# Patient Record
Sex: FEMALE | Race: WHITE | NOT HISPANIC OR LATINO | Employment: FULL TIME | URBAN - METROPOLITAN AREA
[De-identification: names, ages, dates, MRNs, and addresses within clinical notes are randomized per-mention and may not be internally consistent; named-entity substitution may affect disease eponyms.]

---

## 2018-11-01 ENCOUNTER — OFFICE VISIT (OUTPATIENT)
Dept: OBGYN CLINIC | Facility: CLINIC | Age: 53
End: 2018-11-01
Payer: OTHER MISCELLANEOUS

## 2018-11-01 ENCOUNTER — APPOINTMENT (OUTPATIENT)
Dept: RADIOLOGY | Facility: CLINIC | Age: 53
End: 2018-11-01
Payer: OTHER MISCELLANEOUS

## 2018-11-01 DIAGNOSIS — M25.571 PAIN, JOINT, ANKLE AND FOOT, RIGHT: ICD-10-CM

## 2018-11-01 DIAGNOSIS — S90.01XA CONTUSION OF RIGHT ANKLE, INITIAL ENCOUNTER: Primary | ICD-10-CM

## 2018-11-01 PROCEDURE — 99203 OFFICE O/P NEW LOW 30 MIN: CPT | Performed by: PHYSICIAN ASSISTANT

## 2018-11-01 PROCEDURE — 73610 X-RAY EXAM OF ANKLE: CPT

## 2018-11-01 NOTE — LETTER
November 1, 2018     Patient: Rickie Siemens   YOB: 1965   Date of Visit: 11/1/2018       To Whom it May Concern: Rickie Siemens is under my professional care  She was seen in my office on 11/1/2018  She may return to work with limitations on 11/2/2018  Her restrictions are no restraining of children  Will re-evaluate in 2 weeks  Patient is allowed to wear boot while at work       If you have any questions or concerns, please don't hesitate to call  Sincerely,          Yousif Leigh PA-C        CC: No Recipients

## 2018-11-01 NOTE — PROGRESS NOTES
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  The arm crutches were placed up against the wall and they fell down  One of the crutches header over the medial malleolus and she had pain and some swelling over the medial malleolus  It was difficult for her to apply pressure through the foot  She was applying ice to the area with some relief  She denies any Tylenol or anti-inflammatories  She describes the pain as more of a dull achy pain  She denies any numbness or tingling  She denies any constitutional signs or symptoms  PAST MEDICAL HISTORY:  History reviewed  No pertinent past medical history  PAST SURGICAL HISTORY:  History reviewed  No pertinent surgical history  FAMILY HISTORY:  Family History   Problem Relation Age of Onset    Heart failure Mother     Diabetes Father     Stroke Father        SOCIAL HISTORY:  Social History   Substance Use Topics    Smoking status: Never Smoker    Smokeless tobacco: Never Used    Alcohol use Yes       MEDICATIONS:  No current outpatient prescriptions on file      ALLERGIES:  No Known Allergies    REVIEW OF SYSTEMS:  General: no fever, no chills  HEENT:  No loss of hearing or eyesight problems  Eyes:  No red eyes  Respiratory:  No coughing, shortness of breath or wheezing  Cardiovascular:  No chest pain, no palpitations  GI:  Abdomen soft nontender, denies nausea  Endocrine:  No muscle weakness, no frequent urination, no excessive thirst  Urinary:  No dysuria, no incontinence  Musculoskeletal: see HPI and PE  SKIN:  No skin rash, no dry skin  Neurological:  No headaches, no confusion  Psychiatric:  No suicide thoughts, no anxiety, no depression  Review of all other systems is negative    LABS:  HgA1c: No results found for: HGBA1C  BMP: No results found for: GLUCOSE, CALCIUM, NA, K, CO2, CL, BUN, CREATININE    _____________________________________________________  PHYSICAL EXAMINATION:  General: well developed and well nourished, alert, oriented times 3 and appears comfortable  Psychiatric: Normal  HEENT: Trachea Midline, No torticollis  Cardiovascular: No discernable arrhythmia  Pulmonary: No wheezing or stridor  Skin: No masses, erthema, lacerations, fluctation, ulcerations  Neurovascular:   L1-S1 motor and sensory intact, pulses were compared bilaterally and were equal, capillary refill less than 3 seconds  MUSCULOSKELETAL EXAMINATION:  Right ankle:  No erythema noted  Mild edema and ecchymosis noted over the medial malleolus  Skin is warm to touch  Tenderness to palpation over the medial malleolus and just proximal   Range of motion at the ankle is within normal limits but has pain at end range of motions  Strength is 5/5 with pain  Negative anterior drawer, negative posterior drawer, ankle is stable to valgus and varus stress  Patient is neurovascularly intact     _____________________________________________________  STUDIES REVIEWED:  I personally reviewed the x-rays from 11/01/2018  X-rays demonstrated no acute fractures  There are osteophyte formations and evidence of arthritis throughout the ankle  ASSESSMENT/PLAN:    Diagnoses and all orders for this visit:    Contusion of right ankle, initial encounter    Pain, joint, ankle and foot, right  -     XR ankle 3+ vw right; Future        Assessment:   Contusion right medial malleolus    Plan:   Patient was given a low tide boot to help with ambulation  She was instructed to take the boot off for hygiene purposes, driving, and while relaxing at night  She has to wear this while she is at school  She was instructed to use ice, anti-inflammatories and elevation to help with pain and swelling   She is allowed to weightbear as tolerated  She was given a work note with restrictions of no restraining of kids  She is allowed to wear the boot while at work  We will have her follow up in 2 weeks with Dr Octavio Mejia for further evaluation  Follow Up:   Two weeks    PROCEDURES PERFORMED:  None

## 2018-11-15 ENCOUNTER — OFFICE VISIT (OUTPATIENT)
Dept: OBGYN CLINIC | Facility: CLINIC | Age: 53
End: 2018-11-15
Payer: OTHER MISCELLANEOUS

## 2018-11-15 VITALS
HEIGHT: 69 IN | WEIGHT: 293 LBS | HEART RATE: 85 BPM | BODY MASS INDEX: 43.4 KG/M2 | SYSTOLIC BLOOD PRESSURE: 136 MMHG | DIASTOLIC BLOOD PRESSURE: 87 MMHG

## 2018-11-15 DIAGNOSIS — S90.01XD CONTUSION OF RIGHT ANKLE, SUBSEQUENT ENCOUNTER: Primary | ICD-10-CM

## 2018-11-15 PROCEDURE — 99213 OFFICE O/P EST LOW 20 MIN: CPT | Performed by: FAMILY MEDICINE

## 2018-11-15 NOTE — PATIENT INSTRUCTIONS
Ankle Exercises   AMBULATORY CARE:   What you need to know about ankle exercises: Ankle exercises help strengthen your ankle and improve its function after injury  These are beginning exercises  Ask your healthcare provider if you need to see a physical therapist for more advanced exercises  · Do these exercises 3 to 5 days a week , or as directed by your healthcare provider  Ask if you should perform the exercises on each ankle  · Do the exercises in the order that your healthcare provider recommends  This will help prevent swelling, chronic pain, and reinjury  Start with range of motion exercises  Then progress to strengthening exercises, and finally to balancing exercises  · Warm up before you do ankle exercises  Walk or ride a stationary bike for 5 to 10 minutes to prepare your ankle for movement  · Stop if you feel pain  It is normal to feel some discomfort at first  Regular exercise will help decrease your discomfort over time  How to perform range of motion exercises safely:  Begin with range of motion exercises to improve flexibility  Ask your healthcare provider when you can progress to strengthening exercises  · Ankle alphabet:  Sit on a chair so that your feet do not touch the floor  Use your big toe to write each letter of the alphabet  Use only your foot and ankle, and keep your movements small  Do 2 sets  · Calf stretches:      ¨ Sitting calf stretches with a towel:  Sit on the floor with both legs out straight in front of you  Loop a towel around the ball of your injured foot  Grasp the ends of the towel and pull it toward you  Keep your leg and back straight  Do not lean forward as you pull the towel  Hold for 30 seconds  Then relax for 30 seconds  Do 2 sets of 10  ¨ Standing calf stretches:  Stand facing a wall with the foot that is not injured forward and your knee slightly bent   Keep the leg with the injured foot straight and behind you with your toes pointed in slightly  With both heels flat on the floor, press your hips forward  Do not arch your back  Hold for 30 seconds, and then relax for 30 seconds  Do 2 sets of 10  Repeat with your leg bent  Do 2 sets of 10  How to perform strengthening exercises safely:  After you can perform range of motion exercises without pain, you may begin strengthening exercises  Ask your healthcare provider when you can progress to balancing exercises  · Ankle movement in 4 directions:  Sit on the floor with your legs straight in front of you  Keep your heels on the floor for support  ¨ Dorsiflexion:  Begin with your toes pointing straight up  Pull your toes toward your body  Slowly return to the starting position  Do 3 sets of 5      ¨ Plantar flexion:  Begin with your toes pointing straight up  Push your toes away from your body  Slowly return to the starting position  Do 3 sets of 5            ¨ Inversion:  Begin with your toes pointing straight up  Push your toes inward, toward each other  Slowly return to the starting position  Do 3 sets of 5      ¨ Eversion:  Begin with your toes pointing straight up  Push your toes outward, away from each other  Slowly return to the starting position  Do 3 sets of 5          · Toe curls with a towel:  Sit on a chair so that both of your feet are flat on the floor  Place a small towel on the floor in front of your injured foot  Grab the center of the towel with your toes and curl the towel toward you  Relax and repeat  Do 1 set of 5            · [place] pick-ups:  Sit on a chair so that both of your feet are flat on the floor  Place 20 marbles on the floor in front of your injured foot  Use your toes to  one marble at a time and place it into a bowl  Repeat until you have picked up all the marbles  Do 1 set  · Heel raises:      ¨ Single leg heel raises:  Stand with your weight evenly on both feet  Hold on to a chair or a wall for balance   Lift the foot that is not injured off the floor so all your weight is placed on your injured foot  Raise the heel of your injured foot as high as you can  Slowly lower your heel to the floor  Do 1 set of 10  ¨ Double leg heel raises:  Stand with your weight evenly on both feet  Hold on to a chair or a wall for balance  Raise both of your heels as high as you can  Slowly lower your heels to the floor  Do 1 set of 10  · Heel and toe walks:      ¨ Heel walks:  Begin in a standing position  Lift your toes off the floor and walk on your heels  Keep your toes lifted as high as possible  Do 2 sets of 10  ¨ Toe walks:  Begin in a standing position  Lift your heels off the floor and walk on the balls and toes of your feet  Keep your heels lifted as high as possible  Do 2 sets of 10  How to perform a balance exercise safely:  After you can perform strengthening exercises without pain, you may do this beginning balancing exercise  Ask your healthcare provider for more advanced balance exercises  · Single leg stance:  Stand with your weight evenly on both feet, or hold on to a chair or a wall  Do not lean to the side  Lift the foot that is not injured off the floor so all your weight is placed on your injured foot  Balance on your injured foot  Ask your healthcare provider how long to hold this position  Contact your healthcare provider if:   · Your pain becomes worse  · You have new pain  · You have questions or concerns about your condition, care, or exercise program   © 2017 [employer] Information is for End User's use only and may not be sold, redistributed or otherwise used for commercial purposes  All illustrations and images included in CareNotes® are the copyrighted property of [employer] , [employer]  or [employer]  The above information is an  only  It is not intended as medical advice for individual conditions or treatments   Talk to your doctor, nurse or pharmacist before following any medical regimen to see if it is safe and effective for you

## 2018-11-15 NOTE — PROGRESS NOTES
Assessment/Plan:  Assessment/Plan   Diagnoses and all orders for this visit:    Contusion of right ankle, subsequent encounter        53-year-old female special needs  status post right ankle contusion from injury at work on 11/01/2018  Discussed with patient physical exam, impression and plan  Physical exam is noted for very minimal tenderness upon palpation of the medial malleolus  She demonstrates normal range of motion of the ankle and mild weakness with inversion and eversion  Clinical impression that she is recovering well from her injury  She may discontinue wearing of Cam walker boot and resume regular footwear  I have also provided her with printed instructions for home exercise for foot and ankle which she may do on a regular basis  She need only follow up on an as-needed basis  Subjective:   Patient ID: Lloyd Naranjo is a 46 y o  female  Chief Complaint   Patient presents with    Right Ankle - Follow-up       53-year-old female following up for right ankle pain sustained from injury on 11/01/2018  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 and struck the medial malleolus of her right ankle  She had pain that was described as sudden in onset, localized to the medial ankle, throbbing, nonradiating, associated ecchymosis, and worse with bearing weight  She immediately applied ice, but pain worsened and she has difficulty bearing weight  She was evaluated by Orthopedics on the same day and clinical impression was contusion of the ankle  She was placed in Cam walker boot, instructed to continue with icing and take anti-inflammatory  Today she reports significant improvement since the last visit  She has been wearing the Cam walker boot while outside the house, but has been ambulating without it while at home  She denies any pain but reports feeling mild weakness in the ankle  She has not had any new injury since last visit  Ankle Pain   This is a new problem   The current episode started 1 to 4 weeks ago  The problem has been resolved  Associated symptoms include weakness  Pertinent negatives include no arthralgias, joint swelling or numbness  The symptoms are aggravated by walking  She has tried rest, immobilization, NSAIDs and ice for the symptoms  The treatment provided significant relief  The following portions of the patient's history were reviewed and updated as appropriate: She  has no past medical history on file  She  has no past surgical history on file  Her family history includes Diabetes in her father; Heart failure in her mother; Stroke in her father  She  reports that she has never smoked  She has never used smokeless tobacco  She reports that she drinks alcohol  She reports that she does not use drugs  She has No Known Allergies       Review of Systems   Musculoskeletal: Negative for arthralgias and joint swelling  Neurological: Positive for weakness  Negative for numbness  Objective:  Vitals:    11/15/18 0819   BP: 136/87   Pulse: 85   Weight: (!) 137 kg (303 lb)   Height: 5' 8 5" (1 74 m)     Right Ankle Exam     Muscle Strength   Dorsiflexion:  5/5  Plantar flexion:  5/5          Observations     Right Ankle/Foot   Negative for deformity  Tenderness     Right Ankle/Foot   Tenderness in the medial malleolus (Minimal)  No tenderness in the anterior ankle, anterior talofibular ligament, fifth metatarsal base, deltoid ligament, dorsum foot, first metatarsal head, lateral malleolus, peroneal tendon, posterior tibial tendon and posterior talofibular ligament  Active Range of Motion     Right Ankle/Foot   Normal active range of motion    Strength/Myotome Testing     Right Ankle/Foot   Dorsiflexion: 5  Plantar flexion: 5  Inversion: 4+  Eversion: 4+    Tests     Right Ankle/Foot   Negative for anterior drawer, metatarsal squeeze, posterior drawer, syndesmosis squeeze and syndesmosis external rotation         Physical Exam Constitutional: She is oriented to person, place, and time  She appears well-developed  No distress  HENT:   Head: Normocephalic  Eyes: Conjunctivae are normal    Neck: No tracheal deviation present  Cardiovascular: Normal rate  Pulmonary/Chest: Effort normal  No respiratory distress  Abdominal: She exhibits no distension  Musculoskeletal:        Right ankle: Medial malleolus (Minimal) tenderness found  No lateral malleolus and no posterior TFL tenderness found  Right foot: There is no deformity  Neurological: She is alert and oriented to person, place, and time  Skin: Skin is warm and dry  Psychiatric: She has a normal mood and affect  Her behavior is normal    Nursing note and vitals reviewed

## 2021-02-26 DIAGNOSIS — Z23 ENCOUNTER FOR IMMUNIZATION: ICD-10-CM
